# Patient Record
Sex: FEMALE | Race: OTHER | HISPANIC OR LATINO | URBAN - METROPOLITAN AREA
[De-identification: names, ages, dates, MRNs, and addresses within clinical notes are randomized per-mention and may not be internally consistent; named-entity substitution may affect disease eponyms.]

---

## 2021-07-24 ENCOUNTER — IMPORTED ENCOUNTER (OUTPATIENT)
Dept: URBAN - METROPOLITAN AREA CLINIC 1 | Facility: CLINIC | Age: 76
End: 2021-07-24

## 2021-07-24 PROBLEM — H25.813: Noted: 2021-07-24

## 2021-07-24 PROCEDURE — 92004 COMPRE OPH EXAM NEW PT 1/>: CPT

## 2021-07-24 NOTE — PATIENT DISCUSSION
Cataract OU:  Visually Significant secondary to glare discussed the risks benefits alternatives and limitations of cataract surgery. The patient stated a full understanding and a desire to proceed with the procedure. The patient will need to return for preop appointment with cataract measurements and to have any additional questions answered and start pre-operative eye drops as directed. Phaco PCL OS then OD Otherwise follow-up 6 months DFE/glareReturn for an appointment for Ascan/H and PAjit with Dr. Kendal Ibarra.

## 2021-08-31 ENCOUNTER — IMPORTED ENCOUNTER (OUTPATIENT)
Dept: URBAN - METROPOLITAN AREA CLINIC 1 | Facility: CLINIC | Age: 76
End: 2021-08-31

## 2021-08-31 PROBLEM — H25.812: Noted: 2021-08-31

## 2021-08-31 PROCEDURE — 92136 OPHTHALMIC BIOMETRY: CPT

## 2021-08-31 NOTE — PATIENT DISCUSSION
1. Cataract OS:  Visually Significant discussed the risks benefits alternatives and limitations of cataract surgery. The patient stated a full understanding and a desire to proceed with the procedure. Discussed with patient if PO Gtts are more than $120 for all three combined when filling at their Pharmacy please call our office to request generic substitutions. Pt came to pre-op appointment today with ** Lifestyle Questionnaire Completed and Discussed with patient and patient understands will need OTC readers for fine print with the MF lens choice. Pt understood.  Phaco PCL OS

## 2021-09-08 ENCOUNTER — IMPORTED ENCOUNTER (OUTPATIENT)
Dept: URBAN - METROPOLITAN AREA CLINIC 1 | Facility: CLINIC | Age: 76
End: 2021-09-08

## 2021-09-09 ENCOUNTER — IMPORTED ENCOUNTER (OUTPATIENT)
Dept: URBAN - METROPOLITAN AREA CLINIC 1 | Facility: CLINIC | Age: 76
End: 2021-09-09

## 2021-09-09 PROBLEM — Z96.1: Noted: 2021-09-09

## 2021-09-09 PROCEDURE — 99024 POSTOP FOLLOW-UP VISIT: CPT

## 2021-09-09 NOTE — PATIENT DISCUSSION
1. POD#1 CE/IOL OS (Vivity TORIC/LenSx) doing well. Use Prolensa QD OS Zylet BID OS: Use all gtts through completion of PO gtt chart regimen/ Per our instructions given to patient.   Post op Warnings Reiterated RTC as scheduled

## 2021-09-14 ENCOUNTER — IMPORTED ENCOUNTER (OUTPATIENT)
Dept: URBAN - METROPOLITAN AREA CLINIC 1 | Facility: CLINIC | Age: 76
End: 2021-09-14

## 2021-09-14 PROBLEM — H25.811: Noted: 2021-09-14

## 2021-09-14 PROCEDURE — 92136 OPHTHALMIC BIOMETRY: CPT

## 2021-09-14 NOTE — PATIENT DISCUSSION
1.  Cataract OD: Visually Significant secondary to glare discussed the risks benefits alternatives and limitations of cataract surgery. The patient stated a full understanding and a desire to proceed with the procedure. Discussed with patient if PO Gtts are more than $120 for all three combined when filling at their Pharmacy please call our office to request generic substitutions. Phaco PCL OD 2. POW#3  CE/IOL OS (Vivity MF Toric w/LenSx) doing well. Use Zylet BID OS & Prolensa Qdaily OS: Use through completion of po gtt regimen.  F/u as scheduled 2nd eye

## 2021-09-22 ENCOUNTER — IMPORTED ENCOUNTER (OUTPATIENT)
Dept: URBAN - METROPOLITAN AREA CLINIC 1 | Facility: CLINIC | Age: 76
End: 2021-09-22

## 2021-09-23 ENCOUNTER — IMPORTED ENCOUNTER (OUTPATIENT)
Dept: URBAN - METROPOLITAN AREA CLINIC 1 | Facility: CLINIC | Age: 76
End: 2021-09-23

## 2021-09-23 PROBLEM — Z96.1: Noted: 2021-09-23

## 2021-09-23 PROCEDURE — 99024 POSTOP FOLLOW-UP VISIT: CPT

## 2021-09-23 NOTE — PATIENT DISCUSSION
1. POD#1 Phaco/ PCL OD (Vivity Toric MF w/ LenSx) - doing well. Use Zylet BID OD Prolensa Qdaily OD: Use all gtts through completion of PO gtt chart regimen/ Per our instructions given. Post op Warnings Reiterated 2. POW#3 Phaco/ PCL OS (Vivity Toric MF w/ LenSx)- doing well  Use Zylet BID OS & Prolensa Qdaily OS: Use through completion of po gtt regimen.  RTC as scheduled

## 2021-10-15 ENCOUNTER — IMPORTED ENCOUNTER (OUTPATIENT)
Dept: URBAN - METROPOLITAN AREA CLINIC 1 | Facility: CLINIC | Age: 76
End: 2021-10-15

## 2021-10-15 PROBLEM — Z09: Noted: 2021-10-15

## 2021-10-15 PROCEDURE — 99024 POSTOP FOLLOW-UP VISIT: CPT

## 2021-10-15 NOTE — PATIENT DISCUSSION
POW#3 Phaco/ PCL OU (Vivity Toric MF w/ LenSx) -- Doing well. Finish PO meds per PO gtt schedule MRX for glasses given Return for an appointment in July 30 with Dr. Owen Hong.

## 2022-04-02 ASSESSMENT — TONOMETRY
OD_IOP_MMHG: 20
OD_IOP_MMHG: 15
OS_IOP_MMHG: 16
OS_IOP_MMHG: 15
OS_IOP_MMHG: 15
OD_IOP_MMHG: 16
OS_IOP_MMHG: 18
OD_IOP_MMHG: 15
OS_IOP_MMHG: 17

## 2022-04-02 ASSESSMENT — VISUAL ACUITY
OS_CC: 20/30
OS_CC: 20/30-2
OS_GLARE: 20/60
OD_CC: 20/80
OU_CC: 20/30
OD_CC: 20/25
OS_SC: J2
OD_SC: J2
OS_SC: J2
OS_CC: 20/30
OS_SC: 20/30
OU_CC: 20/20
OU_SC: J1
OD_SC: J2
OD_CC: 20/30
OD_SC: 20/40
OS_CC: J2
OS_SC: J2
OD_GLARE: 20/100
OD_CC: J3
OD_GLARE: 20/100
OS_SC: J2
OS_CC: 20/30

## 2022-06-03 ENCOUNTER — EMERGENCY VISIT (OUTPATIENT)
Dept: URBAN - METROPOLITAN AREA CLINIC 1 | Facility: CLINIC | Age: 77
End: 2022-06-03

## 2022-06-03 DIAGNOSIS — H01.023: ICD-10-CM

## 2022-06-03 DIAGNOSIS — Z96.1: ICD-10-CM

## 2022-06-03 DIAGNOSIS — H00.12: ICD-10-CM

## 2022-06-03 DIAGNOSIS — H01.026: ICD-10-CM

## 2022-06-03 PROCEDURE — 92012 INTRM OPH EXAM EST PATIENT: CPT

## 2022-06-03 RX ORDER — DOXYCYCLINE 50 MG/1: 1 CAPSULE ORAL TWICE A DAY

## 2022-06-03 ASSESSMENT — VISUAL ACUITY
OD_SC: 20/30-
OS_SC: 20/25-2

## 2022-06-03 ASSESSMENT — TONOMETRY
OS_IOP_MMHG: 15
OD_IOP_MMHG: 16

## 2022-06-03 NOTE — PATIENT DISCUSSION
Begin Doxycycline 50mg PO BID x 30 days #60 (erx'd). Begin hot compresses TID x 5 minutes for 3 weeks. If without improvement discussed with patient possible Incision and Drainage procedure. Risks and benefits discussed with patient and patient states full understanding.

## 2022-06-20 NOTE — PATIENT DISCUSSION
Educated patient on findings. Continue Keflex 500mg BID po (extended to 10 days instead of prescribed 7) and prescribe Tobradex karlo TID OS x 7 days. Increase warm compresses to BID-TID and begin eyelid scrubs QD/prn. Advised to call/RTC if si/sx persist or worsen - will refer to Dr. Rosa Maria Arriaga or Dr. Zoe Villafana for evaluation. Monitor.

## 2022-06-30 NOTE — PATIENT DISCUSSION
2 chalazions extended keflex to 10 days and switched to tobradex - told better application and warm compresses 2-3x/day and eyelid scrubs; send to [facility] or [place] if not better [email or web address]; [id number].

## 2022-06-30 NOTE — PROCEDURE NOTE: CLINICAL
PROCEDURE NOTE: Excision Chalazion, Multiple, Different Lids #2 TESSA & LLL. Diagnosis: Chalazion. Anesthesia: Subconjunctival. Prep: Betadine Scrub. Prior to treatment, the risks/benefits/alternatives were discussed. The patient wished to proceed with procedure. Local anesthesia was applied and the lid was prepped. The lid was clamped exposing the posterior surface on the eyelid. The chalazia were incised and removed with a curette. Bleeding was controlled and the clamp was removed. The eye was pressure patched with antibiotic ointment. Patient tolerated procedure well. There were no complications. Post procedure instructions given. Shlomo Arriaga

## 2022-07-15 ENCOUNTER — ESTABLISHED PATIENT (OUTPATIENT)
Dept: URBAN - METROPOLITAN AREA CLINIC 1 | Facility: CLINIC | Age: 77
End: 2022-07-15

## 2022-07-15 DIAGNOSIS — H26.493: ICD-10-CM

## 2022-07-15 DIAGNOSIS — H01.024: ICD-10-CM

## 2022-07-15 DIAGNOSIS — H01.021: ICD-10-CM

## 2022-07-15 DIAGNOSIS — H00.12: ICD-10-CM

## 2022-07-15 PROCEDURE — 99214 OFFICE O/P EST MOD 30 MIN: CPT

## 2022-07-15 ASSESSMENT — VISUAL ACUITY
OD_SC: 20/30-2
OD_BAT: 20/40
OS_BAT: 20/40-2
OS_SC: 20/30

## 2022-07-15 ASSESSMENT — TONOMETRY
OD_IOP_MMHG: 16
OS_IOP_MMHG: 16

## 2022-07-15 NOTE — PATIENT DISCUSSION
PT finished Doxycycline. Continue hot compresses TID x 5 minutes for 3 weeks. If without improvement discussed with patient possible Incision and Drainage procedure. Risks and benefits discussed with patient and patient states full understanding.

## 2022-08-05 ENCOUNTER — CLINIC PROCEDURE ONLY (OUTPATIENT)
Dept: URBAN - METROPOLITAN AREA CLINIC 1 | Facility: CLINIC | Age: 77
End: 2022-08-05

## 2022-08-05 DIAGNOSIS — Z96.1: ICD-10-CM

## 2022-08-05 DIAGNOSIS — H26.493: ICD-10-CM

## 2022-08-05 PROCEDURE — 66821 AFTER CATARACT LASER SURGERY: CPT

## 2022-08-05 NOTE — PROCEDURE NOTE: CLINICAL
PROCEDURE NOTE: YAG Capsulotomy OD. Diagnosis: Posterior Capsular Opacity. Anesthesia: Topical. The purpose and nature of the procedure, possible alternative methods of treatment, the risks involved and the possibility of complications were discussed with patient. The Patient wishes to proceed and the consent was signed. 1 gtt Prolensa applied. The laser was then performed under topical anesthesia with no complications. Post op instructions were given to patient as well as a follow-up appointment. Patient was advised to call our office if any questions or concerns. Phoebe Samaniego

## 2022-08-15 NOTE — PROCEDURE NOTE: CLINICAL
PROCEDURE NOTE: Excision Chalazion, Single #1 Left Upper Lid. Diagnosis: Chalazion. Anesthesia: Subconjunctival Lidocaine. Prep: Betadine Scrub. Prior to treatment, the risks/benefits/alternatives were discussed. The patient wished to proceed with procedure. After the risks, benefits, and alternatives to the procedure were discussed with the patient, informed consent was obtained. The patient, the procedure, and the correct site were identified beforehand. Local anesthesia was applied and the lid was prepped. The lid was clamped exposing the posterior surface on the eyelid. The chalazion was incised and removed with a curette. Bleeding was controlled and the clamp was removed. The eye was pressure patched with antibiotic ointment. The patient tolerated the procedure well and left the room in good condition. There were no complications. Post procedure instructions given. Tito Pena

## 2022-08-19 ENCOUNTER — CLINIC PROCEDURE ONLY (OUTPATIENT)
Dept: URBAN - METROPOLITAN AREA CLINIC 1 | Facility: CLINIC | Age: 77
End: 2022-08-19

## 2022-08-19 DIAGNOSIS — H26.492: ICD-10-CM

## 2022-08-19 DIAGNOSIS — Z96.1: ICD-10-CM

## 2022-08-19 PROCEDURE — 66821 AFTER CATARACT LASER SURGERY: CPT

## 2022-08-19 NOTE — PROCEDURE NOTE: CLINICAL
PROCEDURE NOTE: YAG Capsulotomy OS. Diagnosis: Posterior Capsular Opacity. Anesthesia: Topical. The purpose and nature of the procedure, possible alternative methods of treatment, the risks involved and the possibility of complications were discussed with patient. The Patient wishes to proceed and the consent was signed. 1 gtt Prolensa applied. The laser was then performed under topical anesthesia with no complications. Post op instructions were given to patient as well as a follow-up appointment. Patient was advised to call our office if any questions or concerns. Miguel Cordon

## 2022-10-13 ENCOUNTER — POST-OP (OUTPATIENT)
Dept: URBAN - METROPOLITAN AREA CLINIC 1 | Facility: CLINIC | Age: 77
End: 2022-10-13

## 2022-10-13 DIAGNOSIS — Z96.1: ICD-10-CM

## 2022-10-13 DIAGNOSIS — Z98.890: ICD-10-CM

## 2022-10-13 PROCEDURE — 99024 POSTOP FOLLOW-UP VISIT: CPT

## 2022-10-13 ASSESSMENT — TONOMETRY
OD_IOP_MMHG: 15
OS_IOP_MMHG: 16

## 2022-10-13 ASSESSMENT — VISUAL ACUITY
OS_SC: 20/40+2
OD_SC: 20/30

## 2023-10-17 ENCOUNTER — COMPREHENSIVE EXAM (OUTPATIENT)
Dept: URBAN - METROPOLITAN AREA CLINIC 1 | Facility: CLINIC | Age: 78
End: 2023-10-17

## 2023-10-17 DIAGNOSIS — Z96.1: ICD-10-CM

## 2023-10-17 DIAGNOSIS — H04.123: ICD-10-CM

## 2023-10-17 DIAGNOSIS — H01.024: ICD-10-CM

## 2023-10-17 DIAGNOSIS — H16.143: ICD-10-CM

## 2023-10-17 DIAGNOSIS — H01.021: ICD-10-CM

## 2023-10-17 PROCEDURE — 99214 OFFICE O/P EST MOD 30 MIN: CPT

## 2023-10-17 PROCEDURE — 92015 DETERMINE REFRACTIVE STATE: CPT

## 2023-10-17 ASSESSMENT — VISUAL ACUITY
OS_SC: 20/40
OD_SC: 20/25-1
OS_SC: J3
OD_SC: J3

## 2023-10-17 ASSESSMENT — TONOMETRY
OD_IOP_MMHG: 13
OS_IOP_MMHG: 13

## 2024-11-05 ENCOUNTER — COMPREHENSIVE EXAM (OUTPATIENT)
Dept: URBAN - METROPOLITAN AREA CLINIC 1 | Facility: CLINIC | Age: 79
End: 2024-11-05

## 2024-11-05 DIAGNOSIS — Z96.1: ICD-10-CM

## 2024-11-05 DIAGNOSIS — H04.123: ICD-10-CM

## 2024-11-05 DIAGNOSIS — H16.143: ICD-10-CM

## 2024-11-05 DIAGNOSIS — H01.021: ICD-10-CM

## 2024-11-05 DIAGNOSIS — H01.024: ICD-10-CM

## 2024-11-05 PROCEDURE — 99214 OFFICE O/P EST MOD 30 MIN: CPT

## 2025-05-09 ENCOUNTER — FOLLOW UP (OUTPATIENT)
Age: 80
End: 2025-05-09

## 2025-05-09 DIAGNOSIS — H16.143: ICD-10-CM

## 2025-05-09 DIAGNOSIS — H04.123: ICD-10-CM

## 2025-05-09 PROCEDURE — 99213 OFFICE O/P EST LOW 20 MIN: CPT
